# Patient Record
Sex: FEMALE | Race: WHITE | NOT HISPANIC OR LATINO | Employment: PART TIME | URBAN - METROPOLITAN AREA
[De-identification: names, ages, dates, MRNs, and addresses within clinical notes are randomized per-mention and may not be internally consistent; named-entity substitution may affect disease eponyms.]

---

## 2017-03-28 ENCOUNTER — GENERIC CONVERSION - ENCOUNTER (OUTPATIENT)
Dept: OTHER | Facility: OTHER | Age: 47
End: 2017-03-28

## 2017-06-16 ENCOUNTER — LAB REQUISITION (OUTPATIENT)
Dept: LAB | Facility: HOSPITAL | Age: 47
End: 2017-06-16
Payer: MEDICARE

## 2017-06-16 ENCOUNTER — ALLSCRIPTS OFFICE VISIT (OUTPATIENT)
Dept: OTHER | Facility: OTHER | Age: 47
End: 2017-06-16

## 2017-06-16 DIAGNOSIS — Z01.419 ENCOUNTER FOR GYNECOLOGICAL EXAMINATION WITHOUT ABNORMAL FINDING: ICD-10-CM

## 2017-06-16 PROCEDURE — G0145 SCR C/V CYTO,THINLAYER,RESCR: HCPCS | Performed by: NURSE PRACTITIONER

## 2017-06-26 LAB
LAB AP GYN PRIMARY INTERPRETATION: NORMAL
Lab: NORMAL

## 2017-07-07 ENCOUNTER — GENERIC CONVERSION - ENCOUNTER (OUTPATIENT)
Dept: OTHER | Facility: OTHER | Age: 47
End: 2017-07-07

## 2018-01-13 VITALS
SYSTOLIC BLOOD PRESSURE: 110 MMHG | BODY MASS INDEX: 22.7 KG/M2 | DIASTOLIC BLOOD PRESSURE: 68 MMHG | HEIGHT: 63 IN | WEIGHT: 128.13 LBS

## 2018-01-13 NOTE — RESULT NOTES
Verified Results  (1) THIN PREP PAP WITH IMAGING [id number] [employer] Order Number: [id number]     Test Name Result Flag Reference   LAB AP CASE REPORT (Report)     Gynecologic Cytology Report            Case: [id number]                  Authorizing Provider: LILLIE Demarco    Collected:      06/16/2017 1052        First Screen:     ASHLEY Pereyra   Received:      06/19/2017 1213        Specimen:  LIQUID-BASED PAP, SCREENING, Cervix   LAB AP GYN PRIMARY INTERPRETATION      Negative for intraepithelial lesion or malignancy  Electronically signed by ASHLEY Pereyra on 6/26/2017 at 1:11 PM   LAB AP GYN SPECIMEN ADEQUACY      Satisfactory for evaluation  Endocervical/transformation zone component present  LAB AP GYN ADDITIONAL INFORMATION (Report)     [employer] FDA approved ,  and ThinPrep Imaging System are   utilized with strict adherence to the 's instruction manual to   prepare gynecologic and non-gynecologic cytology specimens for the   production of ThinPrep slides as well as for gynecologic ThinPrep imaging  These processes have been validated by our laboratory and/or by the     The Pap test is not a diagnostic procedure and should not be used as the   sole means to detect cervical cancer  It is only a screening procedure to   aid in the detection of cervical cancer and its precursors  Both   false-negative and false-positive results have been experienced  Your   patient's test result should be interpreted in this context together with   the history and clinical findings     LAB AP LMP ABSENT-ABLATION     ABSENT-ABLATION

## 2018-03-02 ENCOUNTER — OFFICE VISIT (OUTPATIENT)
Dept: GYNECOLOGY | Facility: CLINIC | Age: 48
End: 2018-03-02
Payer: MEDICARE

## 2018-03-02 VITALS
WEIGHT: 124 LBS | DIASTOLIC BLOOD PRESSURE: 72 MMHG | BODY MASS INDEX: 21.97 KG/M2 | SYSTOLIC BLOOD PRESSURE: 110 MMHG | HEIGHT: 63 IN

## 2018-03-02 DIAGNOSIS — L29.2 VULVAR ITCHING: Primary | ICD-10-CM

## 2018-03-02 DIAGNOSIS — B37.9 CANDIDA ALBICANS INFECTION: ICD-10-CM

## 2018-03-02 LAB — SL AMB POCT WET MOUNT: ABNORMAL

## 2018-03-02 PROCEDURE — 87210 SMEAR WET MOUNT SALINE/INK: CPT | Performed by: NURSE PRACTITIONER

## 2018-03-02 PROCEDURE — 99213 OFFICE O/P EST LOW 20 MIN: CPT | Performed by: NURSE PRACTITIONER

## 2018-03-02 RX ORDER — CEFADROXIL 500 MG/1
CAPSULE ORAL
COMMUNITY
Start: 2018-02-10

## 2018-03-02 RX ORDER — SIMVASTATIN 20 MG
TABLET ORAL
COMMUNITY
Start: 2018-02-23

## 2018-03-02 RX ORDER — FLUOXETINE 10 MG/1
CAPSULE ORAL
COMMUNITY
Start: 2017-12-23 | End: 2019-06-06 | Stop reason: ALTCHOICE

## 2018-03-02 RX ORDER — PEN NEEDLE, DIABETIC 31 G X1/4"
NEEDLE, DISPOSABLE MISCELLANEOUS
COMMUNITY
Start: 2017-12-26 | End: 2020-09-21 | Stop reason: SDUPTHER

## 2018-03-02 RX ORDER — LISINOPRIL 10 MG/1
TABLET ORAL
COMMUNITY
Start: 2018-02-27

## 2018-03-02 RX ORDER — OMEPRAZOLE 20 MG/1
CAPSULE, DELAYED RELEASE ORAL
COMMUNITY
Start: 2018-02-27

## 2018-03-02 RX ORDER — INSULIN GLARGINE 100 [IU]/ML
INJECTION, SOLUTION SUBCUTANEOUS
COMMUNITY
Start: 2018-01-22 | End: 2020-09-21 | Stop reason: SDUPTHER

## 2018-03-02 RX ORDER — FLUCONAZOLE 150 MG/1
TABLET ORAL
Qty: 2 TABLET | Refills: 1 | Status: SHIPPED | OUTPATIENT
Start: 2018-03-02 | End: 2018-03-05

## 2018-03-02 RX ORDER — ASPIRIN 81 MG/1
81 TABLET, CHEWABLE ORAL
COMMUNITY
Start: 2013-10-04

## 2018-03-02 NOTE — PROGRESS NOTES
Assessment/Plan:    Vulvar itching  Candida albicans     Diagnoses and all orders for this visit:    Vulvar itching  -     fluconazole (DIFLUCAN) 150 mg tablet; Take one tablet today and then repeat dose in 3 days  -     nystatin-triamcinolone (MYCOLOG-II) cream; Apply topically 2 (two) times a day for 7 days    Candida albicans infection  -     fluconazole (DIFLUCAN) 150 mg tablet; Take one tablet today and then repeat dose in 3 days  Subjective: The pt  presents today c/o external itching x 1 month     Patient ID: Saranya Pratt is a 52 y o  female  HPI The patient relates that she began with some external itching about 1 month ago  She did try the use of external Monistat and Vagisil with minimal relief  She states that she has not been bothered with vaginal discharge or odor  She is a known diabetic and she states that her blood sugars do tend to fluctuate but have been  below 200  In fact her blood sugar this morning was 77  She has not used any new soaps, detergents, or other scented products vaginally  She has been using a hot tub more frequently due to a back issue  She is uncomfortable and desires evaluation  The following portions of the patient's history were reviewed and updated as appropriate: current medications, past medical history, past social history and problem list     Review of Systems   Constitutional: Negative  Genitourinary: Negative for dysuria, frequency and vaginal discharge  Positive for vulvar itching   Musculoskeletal: Positive for back pain  Psychiatric/Behavioral: Negative  Objective:      /72   Ht 5' 3" (1 6 m)   Wt 56 2 kg (124 lb)   BMI 21 97 kg/m²          Physical Exam   Constitutional: She appears well-developed and well-nourished  Genitourinary: Cervix exhibits no discharge     Genitourinary Comments: Vulvar area with moderate erythema  Vagina with small amount of thin whitish discharge       PLAN:  We discussed her symptoms and today's findings of yeast   The patient agrees to use the Diflucan as directed plus the external cream twice a day over the next week  She will also monitor her blood sugars closely and be in contact with her PCP should her sugars be  elevated  She will call back if her symptoms are unresolved

## 2018-03-06 DIAGNOSIS — N89.8 VAGINAL ITCHING: Primary | ICD-10-CM

## 2018-03-06 RX ORDER — NYSTATIN 100000 U/G
CREAM TOPICAL 2 TIMES DAILY
Qty: 30 G | Refills: 0 | Status: SHIPPED | OUTPATIENT
Start: 2018-03-06 | End: 2019-06-06 | Stop reason: ALTCHOICE

## 2018-03-06 RX ORDER — TRIAMCINOLONE ACETONIDE 5 MG/G
CREAM TOPICAL 2 TIMES DAILY
Qty: 30 G | Refills: 1 | Status: SHIPPED | OUTPATIENT
Start: 2018-03-06 | End: 2019-06-06 | Stop reason: ALTCHOICE

## 2018-08-06 ENCOUNTER — ANNUAL EXAM (OUTPATIENT)
Dept: GYNECOLOGY | Facility: CLINIC | Age: 48
End: 2018-08-06
Payer: MEDICARE

## 2018-08-06 VITALS
HEIGHT: 63 IN | WEIGHT: 123.4 LBS | SYSTOLIC BLOOD PRESSURE: 100 MMHG | DIASTOLIC BLOOD PRESSURE: 60 MMHG | BODY MASS INDEX: 21.86 KG/M2

## 2018-08-06 DIAGNOSIS — Z01.419 ENCOUNTER FOR ANNUAL ROUTINE GYNECOLOGICAL EXAMINATION: Primary | ICD-10-CM

## 2018-08-06 PROCEDURE — G0101 CA SCREEN;PELVIC/BREAST EXAM: HCPCS | Performed by: NURSE PRACTITIONER

## 2018-08-06 NOTE — PROGRESS NOTES
Subjective      Marie Lemon is a 52 y o  female who presents for her annual exam  Periods are absent due to the ablation procedure, lasting 0 days  Dysmenorrhea:none  Cyclic symptoms include none  No intermenstrual bleeding, spotting, or discharge  She states that her blood sugars continue to fluctuate  She does follow with Endocrine regularly  Current contraception: abstinence  History of abnormal Pap smear: yes - ASCUS with pos  "other" HPV in 2015  Family history of breast cancer: yes - Mother dx  at age 43  Pt  Is BRCA negative  Past Medical History:   Diagnosis Date    ASCUS with positive high risk HPV cervical     Diabetes [facility]     Liver cancer ([facility]      Family History   Problem Relation Age of Onset    Colon cancer Father     Autism Son     Breast cancer Mother      Past Surgical History:   Procedure Laterality Date    APPENDECTOMY      HERNIA REPAIR      HYSTEROSCOPY W/ ENDOMETRIAL ABLATION      LAPAROSCOPIC SMALL BOWEL RESECTION      LIVER BIOPSY      PANCREATICODUODENECTOMY       Social History     Social History    Marital status: /Civil Union     Spouse name: N/A    Number of children: N/A    Years of education: N/A     Occupational History    Not on file       Social History Main Topics    Smoking status: Current Every Day Smoker    Smokeless tobacco: Never Used    Alcohol use No    Drug use: No    Sexual activity: Not Currently     Other Topics Concern    Not on file     Social History Narrative    No narrative on file       Current Outpatient Prescriptions:     Amylase-Lipase-Protease (CREON 10 PO), Take by mouth, Disp: , Rfl:     aspirin 81 mg chewable tablet, Chew 81 mg, Disp: , Rfl:     cefadroxil (DURICEF) 500 mg capsule, , Disp: , Rfl:     FLUoxetine (PROzac) 10 mg capsule, , Disp: , Rfl:     glucagon (GLUCAGON EMERGENCY) 1 MG injection, 1 mg, Disp: , Rfl:     glucose blood test strip, Testing five times daily E 10 9 on insulin pump, Disp: , Rfl:    Insulin Pen Needle (PEN NEEDLES) 31G X 6 MM MISC, Use 6 times a day as needed for pump failure Diag E10 65, Disp: , Rfl:     LANTUS SOLOSTAR injection pen 100 units/mL, , Disp: , Rfl:     lisinopril (ZESTRIL) 10 mg tablet, , Disp: , Rfl:     NOVOLOG 100 UNIT/ML injection, , Disp: , Rfl:     nystatin (MYCOSTATIN) cream, Apply topically 2 (two) times a day, Disp: 30 g, Rfl: 0    nystatin-triamcinolone (MYCOLOG-II) cream, Apply topically 2 (two) times a day for 7 days, Disp: 45 g, Rfl: 1    omeprazole (PriLOSEC) 20 mg delayed release capsule, , Disp: , Rfl:     pancrelipase, Lip-Prot-Amyl, (CREON) 24,000 units, Take 3 capsules by mouth, Disp: , Rfl:     simvastatin (ZOCOR) 20 mg tablet, , Disp: , Rfl:     triamcinolone (KENALOG) 0 5 % cream, Apply topically 2 (two) times a day, Disp: 30 g, Rfl: 1      Review of Systems  Constitutional :no fever, feels well, no tiredness, no recent weight gain or loss  Cardiovascular: no complaints of slow or fast heart beat, no chest pain, no palpitations  Respiratory: no complaints of shortness of shortness of breath, no PORRAS  Breasts:no complaints of breast pain, breast lump, or nipple discharge  Gastrointestinal: no complaints of abdominal pain, constipation,nausea, vomiting, or diarrhea or bloody stools  Genitourinary : no complaints of dysuria, incontinence, pelvic pain, dysmenorrhea,vaginal discharge or abnormal vaginal bleeding  Integumentary: no complaints of skin rash or lesion,itching or dry skin  Neurological: no complaints of headache,numbness, tingling, dizziness or fainting       Objective      /60   Ht 5' 2 5" (1 588 m)   Wt 56 kg (123 lb 6 4 oz)   BMI 22 21 kg/m²     General:   appears stated age","cooperative","alert" normal mood and affect   Neck: Neck: normal, supple,trachea midline, no masses   Heart: regular rate and rhythm, S1, S2 normal, no murmur, click, rub or gallop   Lungs: clear to auscultation bilaterally   Breasts: Breast exam :normal, no dimpling or skin changes noted   Abdomen: soft, non-tender, without masses or organomegaly   Vulva: Normal , no lesions   Vagina: normal , no lesions or dryness   Urethra: normal   Cervix: Normal, no palpable masses A pap smear was not done   Uterus: Normal , non-tender,not enlarged,no palpable masses   Adnexa: Normal, non-tender without fullness or masses                         Assessment     Normal GYN exam      Plan      All questions answered  Breast self exam technique reviewed and patient encouraged to perform self-exam monthly  Dietary diary  Discussed healthy lifestyle modifications  Follow up as needed  Mammogram   The pt  has again been advised to stop smoking

## 2019-06-06 ENCOUNTER — OFFICE VISIT (OUTPATIENT)
Dept: GYNECOLOGY | Facility: CLINIC | Age: 49
End: 2019-06-06
Payer: MEDICARE

## 2019-06-06 VITALS
HEIGHT: 63 IN | DIASTOLIC BLOOD PRESSURE: 78 MMHG | BODY MASS INDEX: 23.21 KG/M2 | HEART RATE: 102 BPM | SYSTOLIC BLOOD PRESSURE: 116 MMHG | WEIGHT: 131 LBS

## 2019-06-06 DIAGNOSIS — Z11.3 SCREEN FOR STD (SEXUALLY TRANSMITTED DISEASE): ICD-10-CM

## 2019-06-06 DIAGNOSIS — R10.2 PELVIC PAIN: Primary | ICD-10-CM

## 2019-06-06 PROCEDURE — 87591 N.GONORRHOEAE DNA AMP PROB: CPT | Performed by: OBSTETRICS & GYNECOLOGY

## 2019-06-06 PROCEDURE — 99213 OFFICE O/P EST LOW 20 MIN: CPT | Performed by: OBSTETRICS & GYNECOLOGY

## 2019-06-06 PROCEDURE — 87491 CHLMYD TRACH DNA AMP PROBE: CPT | Performed by: OBSTETRICS & GYNECOLOGY

## 2019-06-07 LAB
C TRACH DNA SPEC QL NAA+PROBE: NEGATIVE
N GONORRHOEA DNA SPEC QL NAA+PROBE: NEGATIVE

## 2019-08-26 NOTE — PROGRESS NOTES
Assessment/Plan:    Normal findings on routine gyn exam  Advised monthly SBE, annual CBE and continued annual mammography  Reviewed ASCCP guidelines and recommended pap with cotesting q3 yrs for this low risk patient  STI testing was offered and the patient declines; she reports low risk  RTO in one year or sooner PRN  Diagnoses and all orders for this visit:    Encounter for gynecological examination with Papanicolaou smear of cervix  -     Liquid-based pap, screening    Encounter for screening mammogram for malignant neoplasm of breast  -     Mammo screening bilateral w 3d & cad; Future        Subjective:      Patient ID: Casey Patten is a 52 y o  female  This patient presents for routine annual gyn exam    Patient states the pelvic pain she had at last visit resolved on its own and she never had TVU done  Menses are absent since ablation  She denies pelvic pain, dyspareunia, breast concerns, abnormal discharge  She does report SHEA for years and is not interested in treatment at present  Pap/HPV testing up to date and normal   No hx of abnormal paps  Last Mammography normal   No hx of abnormal mammography   but has same partner outside of marriage for last 5 years with 's knowledge  She denies STI concerns  The following portions of the patient's history were reviewed and updated as appropriate: allergies, current medications, past family history, past medical history, past social history, past surgical history and problem list     Review of Systems   Constitutional: Negative  Respiratory: Negative  Cardiovascular: Negative  Gastrointestinal: Positive for diarrhea (3-4x per day since whipple) and nausea (has a hx of a whipple with daily nausea)  Endocrine: Negative  Insulin pump in place   Genitourinary: Negative for dyspareunia, dysuria, frequency, pelvic pain, urgency, vaginal bleeding, vaginal discharge and vaginal pain  Musculoskeletal: Negative  Skin: Negative  Neurological: Negative  Psychiatric/Behavioral: Negative  Objective:      /66 (BP Location: Right arm)   Pulse 72   Ht 5' 3" (1 6 m)   Wt 60 kg (132 lb 3 2 oz)   LMP  (LMP Unknown)   BMI 23 42 kg/m²          Physical Exam   Constitutional: She is oriented to person, place, and time  She appears well-developed and well-nourished  She is cooperative  HENT:   Head: Normocephalic and atraumatic  Neck: Normal range of motion  Neck supple  No thyroid mass and no thyromegaly present  Cardiovascular: Normal rate, regular rhythm and normal heart sounds  Pulmonary/Chest: Effort normal and breath sounds normal  Right breast exhibits no inverted nipple, no mass, no nipple discharge, no skin change and no tenderness  Left breast exhibits no inverted nipple, no mass, no nipple discharge, no skin change and no tenderness  No breast tenderness or discharge  Breasts are symmetrical    Abdominal: Soft  Normal appearance and bowel sounds are normal  There is no hepatosplenomegaly  There is no tenderness  Hernia confirmed negative in the right inguinal area and confirmed negative in the left inguinal area  Insulin pump in place   Genitourinary: Vagina normal and uterus normal  Rectal exam shows no external hemorrhoid  No breast tenderness or discharge  Pelvic exam was performed with patient supine  No labial fusion  There is no rash, tenderness, lesion or injury on the right labia  There is no rash, tenderness, lesion or injury on the left labia  Uterus is not deviated, not enlarged, not fixed and not tender  Cervix exhibits no motion tenderness, no discharge and no friability  Right adnexum displays no mass, no tenderness and no fullness  Left adnexum displays no mass, no tenderness and no fullness  No erythema, tenderness or bleeding in the vagina  No signs of injury around the vagina  No vaginal discharge found     Genitourinary Comments: Urethra normal without lesions  No bladder tenderness   Musculoskeletal: Normal range of motion  Lymphadenopathy:     She has no axillary adenopathy  No inguinal adenopathy noted on the right or left side  Right: No inguinal adenopathy present  Left: No inguinal adenopathy present  Neurological: She is alert and oriented to person, place, and time  Skin: Skin is warm, dry and intact  Psychiatric: She has a normal mood and affect  Her speech is normal and behavior is normal  Cognition and memory are normal    Nursing note and vitals reviewed

## 2019-08-29 ENCOUNTER — ANNUAL EXAM (OUTPATIENT)
Dept: GYNECOLOGY | Facility: CLINIC | Age: 49
End: 2019-08-29
Payer: MEDICARE

## 2019-08-29 VITALS
WEIGHT: 132.2 LBS | SYSTOLIC BLOOD PRESSURE: 118 MMHG | DIASTOLIC BLOOD PRESSURE: 66 MMHG | BODY MASS INDEX: 23.42 KG/M2 | HEART RATE: 72 BPM | HEIGHT: 63 IN

## 2019-08-29 DIAGNOSIS — Z12.31 ENCOUNTER FOR SCREENING MAMMOGRAM FOR MALIGNANT NEOPLASM OF BREAST: ICD-10-CM

## 2019-08-29 DIAGNOSIS — Z01.419 ENCOUNTER FOR GYNECOLOGICAL EXAMINATION WITH PAPANICOLAOU SMEAR OF CERVIX: Primary | ICD-10-CM

## 2019-08-29 PROCEDURE — 87624 HPV HI-RISK TYP POOLED RSLT: CPT | Performed by: OBSTETRICS & GYNECOLOGY

## 2019-08-29 PROCEDURE — G0124 SCREEN C/V THIN LAYER BY MD: HCPCS | Performed by: PATHOLOGY

## 2019-08-29 PROCEDURE — G0145 SCR C/V CYTO,THINLAYER,RESCR: HCPCS | Performed by: PATHOLOGY

## 2019-08-29 PROCEDURE — G0101 CA SCREEN;PELVIC/BREAST EXAM: HCPCS | Performed by: OBSTETRICS & GYNECOLOGY

## 2019-08-29 NOTE — PATIENT INSTRUCTIONS
Normal findings on routine gyn exam  Advised monthly SBE, annual CBE and continued annual mammography  Reviewed ASCCP guidelines and recommended pap with cotesting q3 yrs for this low risk patient  STI testing was offered and the patient declines; she reports low risk  RTO in one year or sooner PRN

## 2019-08-31 LAB
HPV HR 12 DNA CVX QL NAA+PROBE: POSITIVE
HPV16 DNA CVX QL NAA+PROBE: NEGATIVE
HPV18 DNA CVX QL NAA+PROBE: NEGATIVE

## 2019-09-05 ENCOUNTER — TELEPHONE (OUTPATIENT)
Dept: GYNECOLOGY | Facility: CLINIC | Age: 49
End: 2019-09-05

## 2019-09-05 LAB
LAB AP GYN PRIMARY INTERPRETATION: ABNORMAL
Lab: ABNORMAL
PATH INTERP SPEC-IMP: ABNORMAL

## 2019-09-11 ENCOUNTER — PROCEDURE VISIT (OUTPATIENT)
Dept: GYNECOLOGY | Facility: CLINIC | Age: 49
End: 2019-09-11
Payer: MEDICARE

## 2019-09-11 DIAGNOSIS — R87.612 LGSIL ON PAP SMEAR OF CERVIX: Primary | ICD-10-CM

## 2019-09-11 PROCEDURE — 88305 TISSUE EXAM BY PATHOLOGIST: CPT | Performed by: PATHOLOGY

## 2019-09-11 PROCEDURE — 57454 BX/CURETT OF CERVIX W/SCOPE: CPT | Performed by: OBSTETRICS & GYNECOLOGY

## 2019-09-11 NOTE — PROGRESS NOTES
Colposcopy  Date/Time: 9/11/2019 11:13 AM  Performed by: LILLIE Flores  Authorized by: LILLIE Flores     Consent:     Consent obtained:  Verbal and written    Consent given by:  Patient    Procedural risks discussed:  Bleeding, possible continued pain, repeat procedure, infection and failure rate    Patient questions answered: yes      Patient agrees, verbalizes understanding, and wants to proceed: yes      Educational handouts given: yes    Pre-procedure:     Prepped with: acetic acid    Indication:     Indication:  LSIL (+HPV)  Procedure:     Procedure: Colposcopy w/ cervical biopsy and ECC      Under satisfactory analgesia the patient was prepped and draped in the dorsal lithotomy position: yes      [place] speculum was placed in the vagina: yes      Endocervix was curetted using a Kevorkian curette: yes      Cervical biopsy performed with a cervical biopsy punch: yes      Monsel's solution was applied: yes      Biopsy(s): yes      Location:  5 and 10 oclock    Specimen to pathology: yes    Post-procedure:     Findings: White epithelium      Impression: Low grade cervical dysplasia      Patient tolerance of procedure:   Tolerated well, no immediate complications

## 2019-09-16 ENCOUNTER — TELEPHONE (OUTPATIENT)
Dept: GYNECOLOGY | Facility: CLINIC | Age: 49
End: 2019-09-16

## 2020-03-13 ENCOUNTER — TELEPHONE (OUTPATIENT)
Dept: OTHER | Facility: OTHER | Age: 50
End: 2020-03-13

## 2020-09-18 NOTE — PROGRESS NOTES
Assessment/Plan:    Advised monthly SBE, annual CBE and continued annual mammography  Reviewed ASCCP guidelines and recommended pap with cotesting repeated today given hx  STI testing was offered and the patient declines; she reports low risk  Diet/activity recommendations - Calcium 0981-6338 mg daily and Vit D 600-100 IU daily  RTO in one year or sooner PRN  Diagnoses and all orders for this visit:    Encounter for gynecological examination (general) (routine) with abnormal findings    History of abnormal cervical Pap smear    SHEA (stress urinary incontinence, female)        Subjective:      Patient ID: Dante Gomes is a 48 y o  female  This patient presents for routine annual gyn exam    She has a hx of LGSIL with +HPV, - 16, -18 8/29/19 with CATALINA I on colpo  Patient is  and has same partner outside of marriage for 6 years with 's knowledge  She has a long hx of SHEA and would like to continue to monitor  She has a hx of loose stools 3-4x per day for years following whipple procedure  Menses are absent following ablation  She denies pelvic pain, dyspareunia, breast concerns, abnormal discharge, depression/anxiety  Last Mammography normal, 7/23/20  Denies STI concerns  The following portions of the patient's history were reviewed and updated as appropriate: allergies, current medications, past family history, past medical history, past social history, past surgical history and problem list     Review of Systems   Constitutional: Negative  HENT: Negative  Respiratory: Negative  Cardiovascular: Negative  Gastrointestinal: Positive for diarrhea  Endocrine: Negative  Genitourinary: Negative for difficulty urinating, dyspareunia, dysuria, frequency, menstrual problem, pelvic pain, urgency, vaginal bleeding, vaginal discharge and vaginal pain  Musculoskeletal: Negative  Skin: Negative  Neurological: Negative  Psychiatric/Behavioral: Negative  Objective:      /78 (BP Location: Right arm, Patient Position: Sitting, Cuff Size: Standard)   Pulse 87   Ht 5' 3" (1 6 m)   Wt 65 4 kg (144 lb 3 2 oz)   LMP  (LMP Unknown)   BMI 25 54 kg/m²          Physical Exam  Vitals signs and nursing note reviewed  Exam conducted with a chaperone present  Constitutional:       Appearance: Normal appearance  She is well-developed  HENT:      Head: Normocephalic and atraumatic  Neck:      Musculoskeletal: Normal range of motion and neck supple  Thyroid: No thyroid mass or thyromegaly  Cardiovascular:      Rate and Rhythm: Normal rate and regular rhythm  Heart sounds: Normal heart sounds  Pulmonary:      Effort: Pulmonary effort is normal       Breath sounds: Normal breath sounds  Chest:      Breasts: Breasts are symmetrical          Right: No inverted nipple, mass, nipple discharge, skin change or tenderness  Left: No inverted nipple, mass, nipple discharge, skin change or tenderness  Abdominal:      General: Bowel sounds are normal       Palpations: Abdomen is soft  Tenderness: There is no abdominal tenderness  Hernia: There is no hernia in the left inguinal area or right inguinal area  Comments: Insulin pump noted   Genitourinary:     General: Normal vulva  Exam position: Supine  Pubic Area: No rash  Labia:         Right: No rash, tenderness, lesion or injury  Left: No rash, tenderness, lesion or injury  Urethra: No prolapse, urethral pain, urethral swelling or urethral lesion  Vagina: Normal  No signs of injury and foreign body  No vaginal discharge, erythema, tenderness, bleeding, lesions or prolapsed vaginal walls  Cervix: No cervical motion tenderness, discharge, friability, lesion, erythema, cervical bleeding or eversion  Uterus: Not deviated, not enlarged, not fixed, not tender and no uterine prolapse  Adnexa:         Right: No mass, tenderness or fullness  Left: No mass, tenderness or fullness  Rectum: No external hemorrhoid  Comments: Urethra normal without lesions  No bladder tenderness  Musculoskeletal: Normal range of motion  Lymphadenopathy:      Lower Body: No right inguinal adenopathy  No left inguinal adenopathy  Skin:     General: Skin is warm and dry  Neurological:      Mental Status: She is alert and oriented to person, place, and time  Psychiatric:         Speech: Speech normal          Behavior: Behavior normal  Behavior is cooperative

## 2020-09-21 ENCOUNTER — ANNUAL EXAM (OUTPATIENT)
Dept: GYNECOLOGY | Facility: CLINIC | Age: 50
End: 2020-09-21
Payer: COMMERCIAL

## 2020-09-21 VITALS
DIASTOLIC BLOOD PRESSURE: 78 MMHG | WEIGHT: 144.2 LBS | BODY MASS INDEX: 25.55 KG/M2 | HEIGHT: 63 IN | HEART RATE: 87 BPM | SYSTOLIC BLOOD PRESSURE: 118 MMHG

## 2020-09-21 DIAGNOSIS — Z01.411 ENCOUNTER FOR GYNECOLOGICAL EXAMINATION (GENERAL) (ROUTINE) WITH ABNORMAL FINDINGS: Primary | ICD-10-CM

## 2020-09-21 DIAGNOSIS — Z01.419 ENCOUNTER FOR GYNECOLOGICAL EXAMINATION WITH PAPANICOLAOU SMEAR OF CERVIX: ICD-10-CM

## 2020-09-21 DIAGNOSIS — Z87.42 HISTORY OF ABNORMAL CERVICAL PAP SMEAR: ICD-10-CM

## 2020-09-21 DIAGNOSIS — N39.3 SUI (STRESS URINARY INCONTINENCE, FEMALE): ICD-10-CM

## 2020-09-21 PROCEDURE — 88141 CYTOPATH C/V INTERPRET: CPT | Performed by: PATHOLOGY

## 2020-09-21 PROCEDURE — G0101 CA SCREEN;PELVIC/BREAST EXAM: HCPCS | Performed by: OBSTETRICS & GYNECOLOGY

## 2020-09-21 PROCEDURE — G0145 SCR C/V CYTO,THINLAYER,RESCR: HCPCS | Performed by: PATHOLOGY

## 2020-09-21 PROCEDURE — 87624 HPV HI-RISK TYP POOLED RSLT: CPT | Performed by: OBSTETRICS & GYNECOLOGY

## 2020-10-04 LAB
HPV HR 12 DNA CVX QL NAA+PROBE: NEGATIVE
HPV16 DNA CVX QL NAA+PROBE: NEGATIVE
HPV18 DNA CVX QL NAA+PROBE: NEGATIVE

## 2020-10-05 LAB
LAB AP GYN PRIMARY INTERPRETATION: ABNORMAL
Lab: ABNORMAL
PATH INTERP SPEC-IMP: ABNORMAL

## 2020-10-09 ENCOUNTER — TELEPHONE (OUTPATIENT)
Dept: GYNECOLOGY | Facility: CLINIC | Age: 50
End: 2020-10-09

## 2021-05-06 ENCOUNTER — OFFICE VISIT (OUTPATIENT)
Dept: GYNECOLOGY | Facility: CLINIC | Age: 51
End: 2021-05-06
Payer: MEDICARE

## 2021-05-06 VITALS — BODY MASS INDEX: 26.61 KG/M2 | WEIGHT: 150.2 LBS | HEIGHT: 63 IN

## 2021-05-06 DIAGNOSIS — R87.612 LOW GRADE SQUAMOUS INTRAEPITHELIAL LESION (LGSIL) ON CERVICAL PAP SMEAR: Primary | ICD-10-CM

## 2021-05-06 PROCEDURE — 88175 CYTOPATH C/V AUTO FLUID REDO: CPT | Performed by: OBSTETRICS & GYNECOLOGY

## 2021-05-06 PROCEDURE — 87624 HPV HI-RISK TYP POOLED RSLT: CPT | Performed by: OBSTETRICS & GYNECOLOGY

## 2021-05-06 PROCEDURE — 99213 OFFICE O/P EST LOW 20 MIN: CPT | Performed by: OBSTETRICS & GYNECOLOGY

## 2021-05-06 RX ORDER — PANCRELIPASE LIPASE, PANCRELIPASE PROTEASE, PANCRELIPASE AMYLASE 25000; 79000; 105000 [USP'U]/1; [USP'U]/1; [USP'U]/1
2 CAPSULE, DELAYED RELEASE ORAL 2 TIMES DAILY
COMMUNITY

## 2021-05-06 NOTE — PROGRESS NOTES
Assessment/Plan:    Pap done  Will notify patient of results  Diagnoses and all orders for this visit:    Low grade squamous intraepithelial lesion (LGSIL) on cervical Pap smear  -     Liquid-based pap, diagnostic          Subjective:      Patient ID: Que Baitsta is a 48 y o  female  Patient presents for pap  She has a hx of  Pap LGSIL, +HPV, -16, -18 8/29/19  Colpo CATALINA I 9/11/19  Pap LGSIL, - HPV 9/21/20    Denies any gyn concerns  She does however have recurrence of cancer with liver metastasis and is currently undergoing treatment  She is feeling well without SE or pain  She is tearful when discussing diagnosis/prognosis  Does have a support group she attends once per month  The following portions of the patient's history were reviewed and updated as appropriate: allergies, current medications, past family history, past medical history, past social history, past surgical history and problem list     Review of Systems   Constitutional: Negative  HENT: Negative  Respiratory: Negative  Cardiovascular: Negative  Gastrointestinal: Negative  Endocrine: Negative  Genitourinary: Negative for difficulty urinating, dysuria, frequency, pelvic pain, urgency, vaginal bleeding, vaginal discharge and vaginal pain  Musculoskeletal: Negative  Skin: Negative  Neurological: Negative  Psychiatric/Behavioral: Negative  Objective:      Ht 5' 3" (1 6 m)   Wt 68 1 kg (150 lb 3 2 oz)   LMP  (LMP Unknown)   BMI 26 61 kg/m²          Physical Exam  Vitals signs and nursing note reviewed  Exam conducted with a chaperone present  Constitutional:       Appearance: Normal appearance  HENT:      Head: Normocephalic and atraumatic  Neck:      Musculoskeletal: Normal range of motion  Pulmonary:      Effort: Pulmonary effort is normal    Abdominal:      Hernia: There is no hernia in the left inguinal area or right inguinal area  Genitourinary:     General: Normal vulva        Exam position: Supine  Pubic Area: No rash  Labia:         Right: No rash, tenderness, lesion or injury  Left: No rash, tenderness, lesion or injury  Urethra: No prolapse, urethral pain, urethral swelling or urethral lesion  Vagina: No signs of injury and foreign body  No vaginal discharge, erythema, tenderness, bleeding, lesions or prolapsed vaginal walls  Cervix: No discharge, friability, lesion, erythema, cervical bleeding or eversion  Musculoskeletal: Normal range of motion  Lymphadenopathy:      Lower Body: No right inguinal adenopathy  No left inguinal adenopathy  Skin:     General: Skin is warm and dry  Neurological:      Mental Status: She is alert and oriented to person, place, and time

## 2021-05-12 LAB
LAB AP GYN PRIMARY INTERPRETATION: NORMAL
Lab: NORMAL

## 2021-09-10 ENCOUNTER — TELEPHONE (OUTPATIENT)
Dept: GYNECOLOGY | Facility: CLINIC | Age: 51
End: 2021-09-10

## 2021-09-10 DIAGNOSIS — B37.9 YEAST INFECTION: Primary | ICD-10-CM

## 2021-09-10 RX ORDER — FLUCONAZOLE 150 MG/1
150 TABLET ORAL ONCE
Qty: 2 TABLET | Refills: 0 | Status: SHIPPED | OUTPATIENT
Start: 2021-09-10 | End: 2021-09-10

## 2021-09-10 NOTE — TELEPHONE ENCOUNTER
Pt called and is leaving vacation to return home and has the "worse yeast infection" She was inquiring if she could have something called into her local pharmacy

## 2021-09-23 ENCOUNTER — ANNUAL EXAM (OUTPATIENT)
Dept: GYNECOLOGY | Facility: CLINIC | Age: 51
End: 2021-09-23
Payer: COMMERCIAL

## 2021-09-23 VITALS
HEIGHT: 64 IN | BODY MASS INDEX: 25.85 KG/M2 | SYSTOLIC BLOOD PRESSURE: 138 MMHG | DIASTOLIC BLOOD PRESSURE: 82 MMHG | WEIGHT: 151.4 LBS

## 2021-09-23 DIAGNOSIS — R87.612 LGSIL ON PAP SMEAR OF CERVIX: ICD-10-CM

## 2021-09-23 DIAGNOSIS — Z12.4 ENCOUNTER FOR PAPANICOLAOU SMEAR FOR CERVICAL CANCER SCREENING: ICD-10-CM

## 2021-09-23 DIAGNOSIS — Z01.419 ENCOUNTER FOR GYNECOLOGICAL EXAMINATION (GENERAL) (ROUTINE) WITHOUT ABNORMAL FINDINGS: Primary | ICD-10-CM

## 2021-09-23 PROCEDURE — G0476 HPV COMBO ASSAY CA SCREEN: HCPCS | Performed by: OBSTETRICS & GYNECOLOGY

## 2021-09-23 PROCEDURE — G0145 SCR C/V CYTO,THINLAYER,RESCR: HCPCS | Performed by: OBSTETRICS & GYNECOLOGY

## 2021-09-23 PROCEDURE — G0101 CA SCREEN;PELVIC/BREAST EXAM: HCPCS | Performed by: OBSTETRICS & GYNECOLOGY

## 2021-09-23 NOTE — PROGRESS NOTES
Assessment/Plan:    Recommended monthly SBE, annual CBE and annual screening mammo  ASCCP guidelines reviewed and pap with cotesting done  Colonoscopy noted to be up to date  The patient denies STI risk factors and declines testing at this time  Reviewed diet/activity recommendations Calcium 0331-2456 mg and Vit D 600-1000 IU daily  Discussed postmenopausal considerations and symptoms to report  Kegel exercises as instructed  RTO in one year for routine annual gyn exam or sooner PRN  Diagnoses and all orders for this visit:    Encounter for gynecological examination (general) (routine) without abnormal findings    LGSIL on Pap smear of cervix        Subjective:      Patient ID: Rain James is a 46 y o  female  This patient presents for routine annual gyn exam   Patient has a hx of 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  She is having follow up in October to establish plan  She has a hx of abnormal paps, with CATALINA I on colpo - see last note  Will repeat pap today  She denies  bleeding or spotting, VM sx, pelvic pain, dyspareunia, breast concerns, abnormal discharge, bowel/bladder dysfunction, depression/anx  , not sexually active and is monogamous  Denies STI concerns  No hx of STIs  Mammography up to date and normal, 7/23/21  Colonoscopy up to date  The following portions of the patient's history were reviewed and updated as appropriate: allergies, current medications, past family history, past medical history, past social history, past surgical history and problem list     Review of Systems   Constitutional: Negative  Respiratory: Negative  Cardiovascular: Negative  Gastrointestinal: Negative  Endocrine: Negative  Genitourinary: Negative for dysuria, frequency, pelvic pain, urgency, vaginal bleeding, vaginal discharge and vaginal pain  Musculoskeletal: Negative  Skin: Negative  Neurological: Negative      Psychiatric/Behavioral: Negative  Objective:      /82   Ht 5' 4" (1 626 m)   Wt 68 7 kg (151 lb 6 4 oz)   LMP  (LMP Unknown)   BMI 25 99 kg/m²          Physical Exam  Vitals and nursing note reviewed  Exam conducted with a chaperone present  Constitutional:       Appearance: Normal appearance  She is well-developed  HENT:      Head: Normocephalic and atraumatic  Neck:      Thyroid: No thyroid mass or thyromegaly  Cardiovascular:      Rate and Rhythm: Normal rate and regular rhythm  Heart sounds: Normal heart sounds  Pulmonary:      Effort: Pulmonary effort is normal       Breath sounds: Normal breath sounds  Chest:      Breasts: Breasts are symmetrical          Right: No inverted nipple, mass, nipple discharge, skin change or tenderness  Left: No inverted nipple, mass, nipple discharge, skin change or tenderness  Abdominal:      General: Bowel sounds are normal       Palpations: Abdomen is soft  Tenderness: There is no abdominal tenderness  Hernia: There is no hernia in the left inguinal area or right inguinal area  Genitourinary:     General: Normal vulva  Exam position: Supine  Pubic Area: No rash  Labia:         Right: No rash, tenderness, lesion or injury  Left: No rash, tenderness, lesion or injury  Urethra: No prolapse, urethral pain, urethral swelling or urethral lesion  Vagina: Normal  No signs of injury and foreign body  No vaginal discharge, erythema, tenderness, bleeding, lesions or prolapsed vaginal walls  Cervix: No cervical motion tenderness, discharge, friability, lesion, erythema, cervical bleeding or eversion  Uterus: Not deviated, not enlarged, not fixed, not tender and no uterine prolapse  Adnexa:         Right: No mass, tenderness or fullness  Left: No mass, tenderness or fullness  Rectum: No external hemorrhoid        Comments: Urethra normal without lesions  No bladder tenderness  Musculoskeletal:         General: Normal range of motion  Cervical back: Normal range of motion and neck supple  Lymphadenopathy:      Lower Body: No right inguinal adenopathy  No left inguinal adenopathy  Skin:     General: Skin is warm and dry  Neurological:      Mental Status: She is alert and oriented to person, place, and time  Psychiatric:         Speech: Speech normal          Behavior: Behavior normal  Behavior is cooperative

## 2021-09-29 LAB
LAB AP GYN PRIMARY INTERPRETATION: NORMAL
Lab: NORMAL

## 2022-05-15 ENCOUNTER — APPOINTMENT (EMERGENCY)
Dept: CT IMAGING | Facility: HOSPITAL | Age: 52
End: 2022-05-15
Payer: COMMERCIAL

## 2022-05-15 ENCOUNTER — HOSPITAL ENCOUNTER (EMERGENCY)
Facility: HOSPITAL | Age: 52
Discharge: HOME | End: 2022-05-15
Attending: EMERGENCY MEDICINE
Payer: COMMERCIAL

## 2022-05-15 VITALS
WEIGHT: 150 LBS | DIASTOLIC BLOOD PRESSURE: 89 MMHG | SYSTOLIC BLOOD PRESSURE: 145 MMHG | RESPIRATION RATE: 18 BRPM | BODY MASS INDEX: 25.61 KG/M2 | HEART RATE: 71 BPM | TEMPERATURE: 97 F | OXYGEN SATURATION: 98 % | HEIGHT: 64 IN

## 2022-05-15 DIAGNOSIS — R10.9 FLANK PAIN: Primary | ICD-10-CM

## 2022-05-15 DIAGNOSIS — N20.0 NEPHROLITHIASIS: ICD-10-CM

## 2022-05-15 LAB
ALBUMIN SERPL BCP-MCNC: 4.3 G/DL (ref 3.5–5)
ALP SERPL-CCNC: 70 U/L (ref 34–104)
ALT SERPL W P-5'-P-CCNC: 15 U/L (ref 7–52)
ANION GAP SERPL CALCULATED.3IONS-SCNC: 9 MMOL/L (ref 4–13)
AST SERPL W P-5'-P-CCNC: 19 U/L (ref 13–39)
BASOPHILS # BLD AUTO: 0.02 THOUSANDS/ΜL (ref 0–0.1)
BASOPHILS NFR BLD AUTO: 0 % (ref 0–1)
BILIRUB SERPL-MCNC: 0.43 MG/DL (ref 0.2–1)
BILIRUB UR QL STRIP: NEGATIVE
BUN SERPL-MCNC: 10 MG/DL (ref 5–25)
CALCIUM SERPL-MCNC: 9.3 MG/DL (ref 8.4–10.2)
CHLORIDE SERPL-SCNC: 102 MMOL/L (ref 96–108)
CLARITY UR: CLEAR
CO2 SERPL-SCNC: 24 MMOL/L (ref 21–32)
COLOR UR: YELLOW
CREAT SERPL-MCNC: 0.57 MG/DL (ref 0.6–1.3)
EOSINOPHIL # BLD AUTO: 0.21 THOUSAND/ΜL (ref 0–0.61)
EOSINOPHIL NFR BLD AUTO: 4 % (ref 0–6)
ERYTHROCYTE [DISTWIDTH] IN BLOOD BY AUTOMATED COUNT: 13.3 % (ref 11.6–15.1)
GFR SERPL CREATININE-BSD FRML MDRD: 107 ML/MIN/1.73SQ M
GLUCOSE SERPL-MCNC: 148 MG/DL (ref 65–140)
GLUCOSE UR STRIP-MCNC: ABNORMAL MG/DL
HCT VFR BLD AUTO: 42.5 % (ref 34.8–46.1)
HGB BLD-MCNC: 14.8 G/DL (ref 11.5–15.4)
HGB UR QL STRIP.AUTO: NEGATIVE
IMM GRANULOCYTES # BLD AUTO: 0.01 THOUSAND/UL (ref 0–0.2)
IMM GRANULOCYTES NFR BLD AUTO: 0 % (ref 0–2)
KETONES UR STRIP-MCNC: NEGATIVE MG/DL
LACTATE SERPL-SCNC: 1.7 MMOL/L (ref 0.5–2)
LEUKOCYTE ESTERASE UR QL STRIP: NEGATIVE
LIPASE SERPL-CCNC: 12 U/L (ref 11–82)
LYMPHOCYTES # BLD AUTO: 1.83 THOUSANDS/ΜL (ref 0.6–4.47)
LYMPHOCYTES NFR BLD AUTO: 33 % (ref 14–44)
MCH RBC QN AUTO: 31.9 PG (ref 26.8–34.3)
MCHC RBC AUTO-ENTMCNC: 34.8 G/DL (ref 31.4–37.4)
MCV RBC AUTO: 92 FL (ref 82–98)
MONOCYTES # BLD AUTO: 0.41 THOUSAND/ΜL (ref 0.17–1.22)
MONOCYTES NFR BLD AUTO: 7 % (ref 4–12)
NEUTROPHILS # BLD AUTO: 3.14 THOUSANDS/ΜL (ref 1.85–7.62)
NEUTS SEG NFR BLD AUTO: 56 % (ref 43–75)
NITRITE UR QL STRIP: NEGATIVE
NRBC BLD AUTO-RTO: 0 /100 WBCS
PH UR STRIP.AUTO: 6.5 [PH]
PLATELET # BLD AUTO: 166 THOUSANDS/UL (ref 149–390)
PMV BLD AUTO: 10.6 FL (ref 8.9–12.7)
POTASSIUM SERPL-SCNC: 4.3 MMOL/L (ref 3.5–5.3)
PROT SERPL-MCNC: 7.4 G/DL (ref 6.4–8.4)
PROT UR STRIP-MCNC: NEGATIVE MG/DL
RBC # BLD AUTO: 4.64 MILLION/UL (ref 3.81–5.12)
SODIUM SERPL-SCNC: 135 MMOL/L (ref 135–147)
SP GR UR STRIP.AUTO: 1.02 (ref 1–1.03)
UROBILINOGEN UR QL STRIP.AUTO: 0.2 E.U./DL
WBC # BLD AUTO: 5.62 THOUSAND/UL (ref 4.31–10.16)

## 2022-05-15 PROCEDURE — 80053 COMPREHEN METABOLIC PANEL: CPT | Performed by: PHYSICIAN ASSISTANT

## 2022-05-15 PROCEDURE — 96374 THER/PROPH/DIAG INJ IV PUSH: CPT

## 2022-05-15 PROCEDURE — 83690 ASSAY OF LIPASE: CPT | Performed by: PHYSICIAN ASSISTANT

## 2022-05-15 PROCEDURE — 85025 COMPLETE CBC W/AUTO DIFF WBC: CPT | Performed by: PHYSICIAN ASSISTANT

## 2022-05-15 PROCEDURE — 87040 BLOOD CULTURE FOR BACTERIA: CPT | Performed by: PHYSICIAN ASSISTANT

## 2022-05-15 PROCEDURE — 36415 COLL VENOUS BLD VENIPUNCTURE: CPT | Performed by: PHYSICIAN ASSISTANT

## 2022-05-15 PROCEDURE — 99284 EMERGENCY DEPT VISIT MOD MDM: CPT | Performed by: PHYSICIAN ASSISTANT

## 2022-05-15 PROCEDURE — 96361 HYDRATE IV INFUSION ADD-ON: CPT

## 2022-05-15 PROCEDURE — 99284 EMERGENCY DEPT VISIT MOD MDM: CPT

## 2022-05-15 PROCEDURE — 74176 CT ABD & PELVIS W/O CONTRAST: CPT

## 2022-05-15 PROCEDURE — 83605 ASSAY OF LACTIC ACID: CPT | Performed by: PHYSICIAN ASSISTANT

## 2022-05-15 PROCEDURE — 81003 URINALYSIS AUTO W/O SCOPE: CPT | Performed by: PHYSICIAN ASSISTANT

## 2022-05-15 PROCEDURE — 96375 TX/PRO/DX INJ NEW DRUG ADDON: CPT

## 2022-05-15 RX ORDER — ONDANSETRON 2 MG/ML
4 INJECTION INTRAMUSCULAR; INTRAVENOUS ONCE
Status: DISCONTINUED | OUTPATIENT
Start: 2022-05-15 | End: 2022-05-15 | Stop reason: HOSPADM

## 2022-05-15 RX ORDER — ONDANSETRON 4 MG/1
4 TABLET, ORALLY DISINTEGRATING ORAL EVERY 6 HOURS PRN
Qty: 20 TABLET | Refills: 0 | Status: SHIPPED | OUTPATIENT
Start: 2022-05-15

## 2022-05-15 RX ORDER — FENTANYL CITRATE 50 UG/ML
50 INJECTION, SOLUTION INTRAMUSCULAR; INTRAVENOUS ONCE
Status: COMPLETED | OUTPATIENT
Start: 2022-05-15 | End: 2022-05-15

## 2022-05-15 RX ORDER — KETOROLAC TROMETHAMINE 30 MG/ML
15 INJECTION, SOLUTION INTRAMUSCULAR; INTRAVENOUS ONCE
Status: COMPLETED | OUTPATIENT
Start: 2022-05-15 | End: 2022-05-15

## 2022-05-15 RX ORDER — HYDROCODONE BITARTRATE AND ACETAMINOPHEN 5; 325 MG/1; MG/1
1 TABLET ORAL EVERY 6 HOURS PRN
Qty: 10 TABLET | Refills: 0 | Status: SHIPPED | OUTPATIENT
Start: 2022-05-15

## 2022-05-15 RX ADMIN — KETOROLAC TROMETHAMINE 15 MG: 30 INJECTION, SOLUTION INTRAMUSCULAR at 11:40

## 2022-05-15 RX ADMIN — FENTANYL CITRATE 50 MCG: 50 INJECTION, SOLUTION INTRAMUSCULAR; INTRAVENOUS at 11:40

## 2022-05-15 RX ADMIN — SODIUM CHLORIDE 1000 ML: 0.9 INJECTION, SOLUTION INTRAVENOUS at 11:36

## 2022-05-15 NOTE — ED PROVIDER NOTES
History  Chief Complaint   Patient presents with    Flank Pain     Right sided flank pain since Friday  Worse with inspiration     59-year-old female history of neuroendocrine tumor of liver and pancreas 10 years status post Whipple procedure presents complaining of right flank pain  Patient reports her pain is been present over the past 2 days  Pain is a constant, severe, nonradiating pain in her right flank  Patient is actively vomiting upon my entering the room which is nonbloody nonbilious  Patient denies any fevers, chills, chest pain, shortness of breath, pleuritic pain, urinary burning, frequency or hematuria, lower leg pain or swelling or any personal or family history of DVT/PE  Patient notes prior episodes of pyelonephritis that she states feels similar to this  Patient notes she does have scarring of her right kidney which is chronic for her  Prior to Admission Medications   Prescriptions Last Dose Informant Patient Reported? Taking?    LANREOTIDE ACETATE SC   Yes No   Sig: Inject under the skin Once a month   Patient not taking: Reported on 2021   NOVOLOG 100 UNIT/ML injection   Yes No   Pancrelipase, Lip-Prot-Amyl, (Zenpep) 34523-56809 units CPEP   Yes No   Sig: Take 2 capsules by mouth 2 (two) times a day   aspirin 81 mg chewable tablet   Yes No   Sig: Chew 81 mg   cefadroxil (DURICEF) 500 mg capsule   Yes No   glucagon (GLUCAGON EMERGENCY) 1 MG injection   Yes No   Si mg   glucose blood test strip   Yes No   Sig: Testing five times daily E 10 9 on insulin pump   lisinopril (ZESTRIL) 10 mg tablet   Yes No   omeprazole (PriLOSEC) 20 mg delayed release capsule   Yes No   simvastatin (ZOCOR) 20 mg tablet   Yes No      Facility-Administered Medications: None       Past Medical History:   Diagnosis Date    ASCUS with positive high risk HPV cervical     Diabetes (HCC)     Liver cancer (HCC)        Past Surgical History:   Procedure Laterality Date    APPENDECTOMY      HERNIA REPAIR      HYSTEROSCOPY W/ ENDOMETRIAL ABLATION      LAPAROSCOPIC SMALL BOWEL RESECTION      LIVER BIOPSY      PANCREATICODUODENECTOMY         Family History   Problem Relation Age of Onset    Colon cancer Father     Autism Son     Breast cancer Mother      I have reviewed and agree with the history as documented  E-Cigarette/Vaping    E-Cigarette Use Never User      E-Cigarette/Vaping Substances    Nicotine No     THC No     CBD No     Flavoring No     Other No     Unknown No      Social History     Tobacco Use    Smoking status: Current Every Day Smoker    Smokeless tobacco: Never Used   Vaping Use    Vaping Use: Never used   Substance Use Topics    Alcohol use: Yes    Drug use: No       Review of Systems   Constitutional: Negative for chills, fatigue and fever  HENT: Negative for ear pain and sore throat  Eyes: Negative for pain  Respiratory: Negative for cough, shortness of breath and wheezing  Cardiovascular: Negative for chest pain, palpitations and leg swelling  Gastrointestinal: Positive for nausea and vomiting  Negative for abdominal pain, constipation and diarrhea  Endocrine: Negative for polyuria  Genitourinary: Positive for flank pain  Negative for dysuria and pelvic pain  Musculoskeletal: Negative for arthralgias, myalgias, neck pain and neck stiffness  Skin: Negative for rash  Neurological: Negative for dizziness, syncope, light-headedness and headaches  All other systems reviewed and are negative  Physical Exam  Physical Exam  Constitutional:       General: She is not in acute distress  Appearance: Normal appearance  She is well-developed  HENT:      Head: Normocephalic and atraumatic  Right Ear: External ear normal       Left Ear: External ear normal       Mouth/Throat:      Pharynx: No oropharyngeal exudate  Eyes:      Extraocular Movements: Extraocular movements intact     Cardiovascular:      Rate and Rhythm: Normal rate and regular rhythm  Heart sounds: Normal heart sounds  Pulmonary:      Effort: Pulmonary effort is normal       Breath sounds: Normal breath sounds  Abdominal:      General: Bowel sounds are normal       Palpations: Abdomen is soft  Tenderness: There is no abdominal tenderness  Comments: Minimal right-sided CVA tenderness   Musculoskeletal:         General: Normal range of motion  Cervical back: Normal range of motion  Skin:     General: Skin is warm  Capillary Refill: Capillary refill takes less than 2 seconds  Neurological:      General: No focal deficit present  Mental Status: She is alert and oriented to person, place, and time     Psychiatric:         Mood and Affect: Mood normal          Vital Signs  ED Triage Vitals [05/15/22 1052]   Temperature Pulse Respirations Blood Pressure SpO2   (!) 97 °F (36 1 °C) 71 18 145/89 98 %      Temp Source Heart Rate Source Patient Position - Orthostatic VS BP Location FiO2 (%)   Temporal Monitor Sitting Left arm --      Pain Score       6           Vitals:    05/15/22 1052   BP: 145/89   Pulse: 71   Patient Position - Orthostatic VS: Sitting         Visual Acuity      ED Medications  Medications   ondansetron (ZOFRAN) injection 4 mg (0 mg Intravenous Hold 5/15/22 1143)   fentanyl citrate (PF) 100 MCG/2ML 50 mcg (50 mcg Intravenous Given 5/15/22 1140)   ketorolac (TORADOL) injection 15 mg (15 mg Intravenous Given 5/15/22 1140)   sodium chloride 0 9 % bolus 1,000 mL (1,000 mL Intravenous New Bag 5/15/22 1136)       Diagnostic Studies  Results Reviewed     Procedure Component Value Units Date/Time    UA w Reflex to Microscopic w Reflex to Culture [[id number]]  (Abnormal) Collected: 05/15/22 1143    Lab Status: Final result Specimen: Urine, Clean Catch Updated: 05/15/22 1201     Color, UA Yellow     Clarity, UA Clear     Specific [place], UA 1 020     pH, UA 6 5     Leukocytes, UA Negative     Nitrite, UA Negative     Protein, UA Negative mg/dl Glucose, UA 2+ mg/dl      Ketones, UA Negative mg/dl      Urobilinogen, UA 0 2 E U /dl      Bilirubin, UA Negative     Blood, UA Negative    CMP [[id number]]  (Abnormal) Collected: 05/15/22 1132    Lab Status: Final result Specimen: Blood from Arm, Right Updated: 05/15/22 1200     Sodium 135 mmol/L      Potassium 4 3 mmol/L      Chloride 102 mmol/L      CO2 24 mmol/L      ANION GAP 9 mmol/L      BUN 10 mg/dL      Creatinine 0 57 mg/dL      Glucose 148 mg/dL      Calcium 9 3 mg/dL      AST 19 U/L      ALT 15 U/L      Alkaline Phosphatase 70 U/L      Total Protein 7 4 g/dL      Albumin 4 3 g/dL      Total Bilirubin 0 43 mg/dL      eGFR 107 ml/min/1 73sq m     Narrative:      [place] guidelines for Chronic Kidney Disease (CKD):     Stage 1 with normal or high GFR (GFR > 90 mL/min/1 73 square meters)    Stage 2 Mild CKD (GFR = 60-89 mL/min/1 73 square meters)    Stage 3A Moderate CKD (GFR = 45-59 mL/min/1 73 square meters)    Stage 3B Moderate CKD (GFR = 30-44 mL/min/1 73 square meters)    Stage 4 Severe CKD (GFR = 15-29 mL/min/1 73 square meters)    Stage 5 End Stage CKD (GFR <15 mL/min/1 73 square meters)  Note: GFR calculation is accurate only with a steady state creatinine    Lipase [[id number]]  (Normal) Collected: 05/15/22 1132    Lab Status: Final result Specimen: Blood from Arm, Right Updated: 05/15/22 1200     Lipase 12 u/L     Lactic acid [[id number]]  (Normal) Collected: 05/15/22 1132    Lab Status: Final result Specimen: Blood from Arm, Right Updated: 05/15/22 1159     LACTIC ACID 1 7 mmol/L     Narrative:      Result may be elevated if tourniquet was used during collection      CBC and differential [[id number]] Collected: 05/15/22 1132    Lab Status: Final result Specimen: Blood from Arm, Right Updated: 05/15/22 1145     WBC 5 62 Thousand/uL      RBC 4 64 Million/uL      Hemoglobin 14 8 g/dL      Hematocrit 42 5 %      MCV 92 fL      MCH 31 9 pg      MCHC 34 8 g/dL      RDW 13 3 %      MPV 10 6 fL      Platelets 691 Thousands/uL      nRBC 0 /100 WBCs      Neutrophils Relative 56 %      Immat GRANS % 0 %      Lymphocytes Relative 33 %      Monocytes Relative 7 %      Eosinophils Relative 4 %      Basophils Relative 0 %      Neutrophils Absolute 3 14 Thousands/µL      Immature Grans Absolute 0 01 Thousand/uL      Lymphocytes Absolute 1 83 Thousands/µL      Monocytes Absolute 0 41 Thousand/µL      Eosinophils Absolute 0 21 Thousand/µL      Basophils Absolute 0 02 Thousands/µL     Blood culture #2 [[id number]] Collected: 05/15/22 1132    Lab Status: In process Specimen: Blood from Arm, Right Updated: 05/15/22 1140    Blood culture #1 [[id number]] Collected: 05/15/22 1132    Lab Status: In process Specimen: Blood from Arm, Left Updated: 05/15/22 1140                 CT abdomen pelvis wo contrast   Final Result by Esther Cisneros MD (05/15 1305)      No evidence for bowel obstruction  Status post Whipple procedure with expected postoperative changes  Correlate with outside MRI for findings related to known metastatic liver lesions  Cortical atrophy and scarring of the right kidney and compensatory hypertrophy of the left kidney  No evidence for hydronephrosis  Punctate focus of nephrolithiasis versus parenchymal calcification in the right lower pole is noted  Workstation performed: [id number]                    Procedures  Procedures         ED Course                               SBIRT 22yo+    Flowsheet Row Most Recent Value   SBIRT (25 yo +)    In order to provide better care to our patients, we are screening all of our patients for alcohol and drug use  Would it be okay to ask you these screening questions? No Filed at: 05/15/2022 1219                    MDM  Number of Diagnoses or Management Options  Flank pain  Nephrolithiasis  Diagnosis management comments: Patient presented with 2 days of right flank pain and associated nausea and vomiting    Patient did report some increased pain in her right flank with movement and with deep breaths however she denies any pleuritic pain in her chest   Patient not having any chest pain, shortness of breath  Patient not tachycardic, hypoxic and is not have any clinical signs or symptoms of DVT/PE  Doubt DVT as cause of patient's symptoms  Labs urinalysis reassuring  Doubt UTI/pyelonephritis  No significant abdominal tenderness however given patient's history, CT was obtained that demonstrates relatively stable findings  Nephrolithiasis noted  Suspect nephrolithiasis as cause of patient's pain however I informed the patient of the risk of diagnostic uncertainty and that I recommend she follow-up with her PCP and urologist and return to the ER with any change or worsening of her symptoms  Patient refuse Zofran in the ED stating she did not believe that she needed it  Patient is comfortable plan of p r n  pain medications, antiemetic and outpatient follow-up  Portions of the record may have been created with voice recognition software  Occasional wrong word or "sound a like" substitutions may have occurred due to the inherent limitations of voice recognition software  Read the chart carefully and recognize, using context, where substitutions have occurred  Disposition  Final diagnoses:   Flank pain   Nephrolithiasis     Time reflects when diagnosis was documented in both MDM as applicable and the Disposition within this note     Time User Action Codes Description Comment    5/15/2022  1:54 PM Chikis Toro Add [R10 9] Flank pain     5/15/2022  1:54 PM Chikis Toro Add [N20 0] Nephrolithiasis       ED Disposition     ED Disposition   Discharge    Condition   Stable    Date/Time   Sun May 15, 2022  1:54 PM    [facility] discharge to home/self care                 Follow-up Information    None         Patient's Medications   Discharge Prescriptions    HYDROCODONE-ACETAMINOPHEN (NORCO) 5-325 MG PER TABLET    Take 1 tablet by mouth every 6 (six) hours as needed for pain for up to 10 doses Max Daily Amount: 4 tablets       Start Date: 5/15/2022 End Date: --       Order Dose: 1 tablet       Quantity: 10 tablet    Refills: 0    ONDANSETRON (ZOFRAN ODT) 4 MG DISINTEGRATING TABLET    Take 1 tablet (4 mg total) by mouth every 6 (six) hours as needed for nausea or vomiting       Start Date: 5/15/2022 End Date: --       Order Dose: 4 mg       Quantity: 20 tablet    Refills: 0       No discharge procedures on file      PDMP Review     None          ED Provider  Electronically Signed by           Guillermina Kussmaul, PA-C  05/15/22 5406

## 2022-05-15 NOTE — DISCHARGE INSTRUCTIONS
Call your urologist tomorrow  Use Margaret as needed for pain  Do not drive on this medication  Take Zofran as needed for nausea  Follow-up with your family doctor and return to the ER with any significant change or worsening of your symptoms

## 2022-05-21 LAB
BACTERIA BLD CULT: NORMAL
BACTERIA BLD CULT: NORMAL

## 2022-08-04 DIAGNOSIS — Z12.31 ENCOUNTER FOR SCREENING MAMMOGRAM FOR MALIGNANT NEOPLASM OF BREAST: ICD-10-CM

## 2022-09-23 NOTE — PROGRESS NOTES
Assessment/Plan:     Recommended monthly SBE, annual CBE and annual screening mammo  ASCCP guidelines reviewed and pap with cotesting repeated given hx  The patient denies STI risk factors and declines testing at this time  Reviewed diet/activity recommendations Calcium 1200  mg and Vit D 600-1000 IU daily  Discussed postmenopausal considerations and symptoms to report  Kegel exercises as instructed  RTO in one year for routine annual gyn exam or sooner PRN  Diagnoses and all orders for this visit:    Encounter for gynecological examination (general) (routine) with abnormal findings    HPV in female        Subjective:      Patient ID: Rupali Girard is a 46 y o  female        This patient presents for routine annual gyn exam   Patient has a hx of 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  She has a hx of abnormal paps, with CATALINA I on colpo  Last pap normal, 9/23/21  Will repeat today  She denies  bleeding or spotting, VM sx, pelvic pain, dyspareunia, breast concerns, abnormal discharge, bowel/bladder dysfunction, depression/anx  , not sexually active and is monogamous  Denies STI concerns  No hx of STIs  Mammography up to date and normal, 7/29/22  Colonoscopy not done to date, pt declines  The following portions of the patient's history were reviewed and updated as appropriate: allergies, current medications, past family history, past medical history, past social history, past surgical history and problem list     Review of Systems   Constitutional: Negative  Respiratory: Negative  Cardiovascular: Negative  Gastrointestinal: Negative  Endocrine: Negative  Genitourinary: Negative for dysuria, frequency, pelvic pain, urgency, vaginal bleeding, vaginal discharge and vaginal pain  Musculoskeletal: Negative  Skin: Negative  Neurological: Negative  Psychiatric/Behavioral: Negative            Objective:      /84   Pulse 83   Ht 5' 4" (1 626 m) Wt 68 3 kg (150 lb 9 6 oz)   LMP  (LMP Unknown)   BMI 25 85 kg/m²          Physical Exam  Vitals and nursing note reviewed  Exam conducted with a chaperone present  Constitutional:       Appearance: Normal appearance  She is well-developed  HENT:      Head: Normocephalic and atraumatic  Neck:      Thyroid: No thyroid mass or thyromegaly  Cardiovascular:      Rate and Rhythm: Normal rate and regular rhythm  Heart sounds: Normal heart sounds  Pulmonary:      Effort: Pulmonary effort is normal       Breath sounds: Normal breath sounds  Chest:   Breasts: Breasts are symmetrical       Right: No inverted nipple, mass, nipple discharge, skin change or tenderness  Left: No inverted nipple, mass, nipple discharge, skin change or tenderness  Abdominal:      General: Bowel sounds are normal       Palpations: Abdomen is soft  Tenderness: There is no abdominal tenderness  Hernia: There is no hernia in the left inguinal area or right inguinal area  Genitourinary:     General: Normal vulva  Exam position: Supine  Pubic Area: No rash  Labia:         Right: No rash, tenderness, lesion or injury  Left: No rash, tenderness, lesion or injury  Urethra: No prolapse, urethral pain, urethral swelling or urethral lesion  Vagina: Normal  No signs of injury and foreign body  No vaginal discharge, erythema, tenderness, bleeding, lesions or prolapsed vaginal walls  Cervix: No cervical motion tenderness, discharge, friability, lesion, erythema, cervical bleeding or eversion  Uterus: Not deviated, not enlarged, not fixed, not tender and no uterine prolapse  Adnexa:         Right: No mass, tenderness or fullness  Left: No mass, tenderness or fullness  Rectum: No external hemorrhoid  Comments: Urethra normal without lesions  No bladder tenderness  Musculoskeletal:         General: Normal range of motion        Cervical back: Normal range of motion and neck supple  Lymphadenopathy:      Lower Body: No right inguinal adenopathy  No left inguinal adenopathy  Skin:     General: Skin is warm and dry  Neurological:      Mental Status: She is alert and oriented to person, place, and time  Psychiatric:         Speech: Speech normal          Behavior: Behavior normal  Behavior is cooperative

## 2022-09-26 ENCOUNTER — ANNUAL EXAM (OUTPATIENT)
Dept: GYNECOLOGY | Facility: CLINIC | Age: 52
End: 2022-09-26
Payer: MEDICARE

## 2022-09-26 VITALS
SYSTOLIC BLOOD PRESSURE: 124 MMHG | HEART RATE: 83 BPM | WEIGHT: 150.6 LBS | BODY MASS INDEX: 25.71 KG/M2 | HEIGHT: 64 IN | DIASTOLIC BLOOD PRESSURE: 84 MMHG

## 2022-09-26 DIAGNOSIS — Z12.4 ENCOUNTER FOR PAPANICOLAOU SMEAR FOR CERVICAL CANCER SCREENING: ICD-10-CM

## 2022-09-26 DIAGNOSIS — B97.7 HPV IN FEMALE: ICD-10-CM

## 2022-09-26 DIAGNOSIS — Z01.411 ENCOUNTER FOR GYNECOLOGICAL EXAMINATION (GENERAL) (ROUTINE) WITH ABNORMAL FINDINGS: Primary | ICD-10-CM

## 2022-09-26 PROCEDURE — G0145 SCR C/V CYTO,THINLAYER,RESCR: HCPCS | Performed by: OBSTETRICS & GYNECOLOGY

## 2022-09-26 PROCEDURE — G0101 CA SCREEN;PELVIC/BREAST EXAM: HCPCS | Performed by: OBSTETRICS & GYNECOLOGY

## 2022-09-26 PROCEDURE — G0476 HPV COMBO ASSAY CA SCREEN: HCPCS | Performed by: OBSTETRICS & GYNECOLOGY

## 2022-10-05 LAB
LAB AP GYN PRIMARY INTERPRETATION: NORMAL
Lab: NORMAL

## 2023-10-30 NOTE — PROGRESS NOTES
Assessment/Plan:    Recommended monthly SBE, annual CBE and annual screening mammo. ASCCP guidelines reviewed and pap with cotesting done. Colonoscopy needs to be scheduled. Reviewed diet/activity recommendations Calcium 1200 mg and Vit D 600-1000 IU daily. Discussed postmenopausal considerations and symptoms to report. Kegel exercises as instructed. RTO in one year for routine annual gyn exam or sooner PRN. Diagnoses and all orders for this visit:    Encounter for gynecological examination (general) (routine) without abnormal findings        Subjective:      Patient ID: Alex Clements is a 48 y.o. female. This patient presents for routine annual gyn exam.  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 She has a hx of abnormal paps, with CATALINA I on colpo. Paps normal, 9/23/21, 9/26/22. Will repeat today  She denies  bleeding or spotting, VM sx, pelvic pain, dyspareunia, breast concerns, abnormal discharge, bowel/bladder dysfunction, depression/anx. , not sexually active. Mammography up to date and normal, 8/11/23 at [facility]. ABUS recommended, pt declines. Colonoscopy not done to date, pt declines. The following portions of the patient's history were reviewed and updated as appropriate: allergies, current medications, past family history, past medical history, past social history, past surgical history and problem list.    Review of Systems   Constitutional: Negative. Respiratory: Negative. Cardiovascular: Negative. Gastrointestinal: Negative. Endocrine: Negative. Genitourinary:  Negative for dysuria, frequency, pelvic pain, urgency, vaginal bleeding, vaginal discharge and vaginal pain. Musculoskeletal: Negative. Skin: Negative. Neurological: Negative. Psychiatric/Behavioral: Negative.            Objective:      /78   Pulse 81   Ht 5' 3" (1.6 m)   Wt 66.2 kg (146 lb)   LMP  (LMP Unknown)   BMI 25.86 kg/m² Physical Exam  Vitals and nursing note reviewed. Exam conducted with a chaperone present. Constitutional:       Appearance: Normal appearance. She is well-developed. HENT:      Head: Normocephalic and atraumatic. Neck:      Thyroid: No thyroid mass or thyromegaly. Cardiovascular:      Rate and Rhythm: Normal rate and regular rhythm. Heart sounds: Normal heart sounds. Pulmonary:      Effort: Pulmonary effort is normal.      Breath sounds: Normal breath sounds. Chest:   Breasts:     Breasts are symmetrical.      Right: No inverted nipple, mass, nipple discharge, skin change or tenderness. Left: No inverted nipple, mass, nipple discharge, skin change or tenderness. Abdominal:      General: Bowel sounds are normal.      Palpations: Abdomen is soft. Tenderness: There is no abdominal tenderness. Hernia: There is no hernia in the left inguinal area or right inguinal area. Genitourinary:     General: Normal vulva. Exam position: Supine. Pubic Area: No rash. Labia:         Right: No rash, tenderness, lesion or injury. Left: No rash, tenderness, lesion or injury. Urethra: No prolapse, urethral pain, urethral swelling or urethral lesion. Vagina: Normal. No signs of injury and foreign body. No vaginal discharge, erythema, tenderness, bleeding, lesions or prolapsed vaginal walls. Cervix: No cervical motion tenderness, discharge, friability, lesion, erythema, cervical bleeding or eversion. Uterus: Not deviated, not enlarged, not fixed, not tender and no uterine prolapse. Adnexa:         Right: No mass, tenderness or fullness. Left: No mass, tenderness or fullness. Rectum: No external hemorrhoid. Comments: Urethra normal without lesions  No bladder tenderness  Musculoskeletal:         General: Normal range of motion. Cervical back: Normal range of motion and neck supple.    Lymphadenopathy:      Lower Body: No right inguinal adenopathy. No left inguinal adenopathy. Skin:     General: Skin is warm and dry. Neurological:      Mental Status: She is alert and oriented to person, place, and time. Psychiatric:         Speech: Speech normal.         Behavior: Behavior normal. Behavior is cooperative.

## 2023-11-01 ENCOUNTER — ANNUAL EXAM (OUTPATIENT)
Dept: GYNECOLOGY | Facility: CLINIC | Age: 53
End: 2023-11-01
Payer: COMMERCIAL

## 2023-11-01 VITALS
HEART RATE: 81 BPM | SYSTOLIC BLOOD PRESSURE: 110 MMHG | BODY MASS INDEX: 25.87 KG/M2 | HEIGHT: 63 IN | DIASTOLIC BLOOD PRESSURE: 78 MMHG | WEIGHT: 146 LBS

## 2023-11-01 DIAGNOSIS — Z01.419 ENCOUNTER FOR GYNECOLOGICAL EXAMINATION WITH PAPANICOLAOU SMEAR OF CERVIX: ICD-10-CM

## 2023-11-01 DIAGNOSIS — Z01.419 ENCOUNTER FOR GYNECOLOGICAL EXAMINATION (GENERAL) (ROUTINE) WITHOUT ABNORMAL FINDINGS: Primary | ICD-10-CM

## 2023-11-01 PROCEDURE — 99396 PREV VISIT EST AGE 40-64: CPT | Performed by: OBSTETRICS & GYNECOLOGY

## 2023-11-01 PROCEDURE — G0476 HPV COMBO ASSAY CA SCREEN: HCPCS | Performed by: OBSTETRICS & GYNECOLOGY

## 2023-11-01 PROCEDURE — G0145 SCR C/V CYTO,THINLAYER,RESCR: HCPCS | Performed by: OBSTETRICS & GYNECOLOGY

## 2023-11-07 LAB
LAB AP GYN PRIMARY INTERPRETATION: NORMAL
Lab: NORMAL